# Patient Record
Sex: FEMALE | URBAN - METROPOLITAN AREA
[De-identification: names, ages, dates, MRNs, and addresses within clinical notes are randomized per-mention and may not be internally consistent; named-entity substitution may affect disease eponyms.]

---

## 2020-06-17 ENCOUNTER — NURSE TRIAGE (OUTPATIENT)
Dept: NURSING | Facility: CLINIC | Age: 30
End: 2020-06-17

## 2020-06-18 NOTE — TELEPHONE ENCOUNTER
states patient has a broken ankle and is requesting addresses of [facility] in [place] ; Refused assitsance in selecting nearest  ED and refuses triage   Given information requested   Whitney James RN  FNA    Additional Information    General information question, no triage required and triager able to answer question    Protocols used: INFORMATION ONLY CALL-A-AH